# Patient Record
Sex: FEMALE | Race: WHITE | NOT HISPANIC OR LATINO | Employment: OTHER | URBAN - METROPOLITAN AREA
[De-identification: names, ages, dates, MRNs, and addresses within clinical notes are randomized per-mention and may not be internally consistent; named-entity substitution may affect disease eponyms.]

---

## 2017-05-01 ENCOUNTER — PATIENT OUTREACH (OUTPATIENT)
Dept: ADMINISTRATIVE | Facility: HOSPITAL | Age: 62
End: 2017-05-01

## 2017-05-01 NOTE — LETTER
May 10, 2017    Rose Marie Keenan  [address] [place] [address] [place] [address]             [facility]  [address]  [facility] [address]  Phone: [phone] Dear Mrs. Keenan:     [facility] is committed to your overall health.  To help you get the most out of each of your visits, we will review your information to make sure you are up to date on all of your recommended tests and/or procedures.  Our records indicate that you are overdue for labs and annual with Ericka Baird NP.  Please call [phone] to schedule.  If you have changed primary care providers please let us know so we can update your chart.       Sincerely,       Elif Gomes LPN Clinical Care Coordinator   [facility]   [phone]

## 2017-06-21 ENCOUNTER — PATIENT OUTREACH (OUTPATIENT)
Dept: ADMINISTRATIVE | Facility: HOSPITAL | Age: 62
End: 2017-06-21

## 2017-06-21 NOTE — LETTER
June 21, 2017    Rose Marie Keenan  [address] [place] [address] [place] [address]             [facility]  [address]  [place] [address]  Phone: [phone] Dear Mrs. Keenan:    [facility] is committed to your overall health.  To help you get the most out of each of your visits, we will review your information to make sure you are up to date on all of your recommended tests and/or procedures.      Ericka Baird NP has found that you may be due for a tetanus vaccine, an annual diabetic foot exam, an annual diabetic eye exam, a fasting cholesterol check, a Hemoglobin A1C diabetes blood test, a Hepatitis C screening blood test, a mammogram and a DEXA scan to assess for osteoporosis.     If you have had any of the above done at another facility, please bring the records or information with you so that your record at [facility] will be complete.  If you would like to schedule any of these, please contact me.    If you are currently taking medication, please bring it with you to your appointment for review.    Also, if you have any type of Advanced Directives, please bring them with you to your office visit so we may scan them into your chart.        If you have any questions or concerns, please don't hesitate to call.    Sincerely,        Elif Gomes LPN Clinical Care Coordinator  [facility]  [phone]

## 2017-06-21 NOTE — PROGRESS NOTES
Pre visit message sent to patient per patient portal on 5/1/2017.  Spoke with patient and she refused appointment right now.  Sending letter with Health Maintenance due and she will call to schedule.